# Patient Record
Sex: MALE | Race: WHITE | NOT HISPANIC OR LATINO | Employment: FULL TIME | URBAN - METROPOLITAN AREA
[De-identification: names, ages, dates, MRNs, and addresses within clinical notes are randomized per-mention and may not be internally consistent; named-entity substitution may affect disease eponyms.]

---

## 2023-10-16 ENCOUNTER — LAB (OUTPATIENT)
Dept: LAB | Facility: LAB | Age: 33
End: 2023-10-16
Payer: COMMERCIAL

## 2023-10-16 ENCOUNTER — OFFICE VISIT (OUTPATIENT)
Dept: PRIMARY CARE | Facility: CLINIC | Age: 33
End: 2023-10-16
Payer: COMMERCIAL

## 2023-10-16 VITALS
DIASTOLIC BLOOD PRESSURE: 70 MMHG | HEIGHT: 74 IN | SYSTOLIC BLOOD PRESSURE: 128 MMHG | OXYGEN SATURATION: 98 % | WEIGHT: 199 LBS | BODY MASS INDEX: 25.54 KG/M2 | HEART RATE: 62 BPM | RESPIRATION RATE: 16 BRPM | TEMPERATURE: 97.2 F

## 2023-10-16 DIAGNOSIS — Z00.00 HEALTHCARE MAINTENANCE: ICD-10-CM

## 2023-10-16 DIAGNOSIS — L25.9 CONTACT DERMATITIS, UNSPECIFIED CONTACT DERMATITIS TYPE, UNSPECIFIED TRIGGER: ICD-10-CM

## 2023-10-16 DIAGNOSIS — Z00.00 HEALTHCARE MAINTENANCE: Primary | ICD-10-CM

## 2023-10-16 LAB
ALBUMIN SERPL BCP-MCNC: 4.6 G/DL (ref 3.4–5)
ALP SERPL-CCNC: 55 U/L (ref 33–120)
ALT SERPL W P-5'-P-CCNC: 22 U/L (ref 10–52)
ANION GAP SERPL CALC-SCNC: 12 MMOL/L (ref 10–20)
AST SERPL W P-5'-P-CCNC: 20 U/L (ref 9–39)
BASOPHILS # BLD AUTO: 0.05 X10*3/UL (ref 0–0.1)
BASOPHILS NFR BLD AUTO: 0.6 %
BILIRUB SERPL-MCNC: 1.2 MG/DL (ref 0–1.2)
BUN SERPL-MCNC: 18 MG/DL (ref 6–23)
CALCIUM SERPL-MCNC: 10.1 MG/DL (ref 8.6–10.3)
CHLORIDE SERPL-SCNC: 101 MMOL/L (ref 98–107)
CHOLEST SERPL-MCNC: 225 MG/DL (ref 0–199)
CHOLESTEROL/HDL RATIO: 6
CO2 SERPL-SCNC: 28 MMOL/L (ref 21–32)
CREAT SERPL-MCNC: 1.07 MG/DL (ref 0.5–1.3)
EOSINOPHIL # BLD AUTO: 0.38 X10*3/UL (ref 0–0.7)
EOSINOPHIL NFR BLD AUTO: 4.3 %
ERYTHROCYTE [DISTWIDTH] IN BLOOD BY AUTOMATED COUNT: 12.4 % (ref 11.5–14.5)
GFR SERPL CREATININE-BSD FRML MDRD: >90 ML/MIN/1.73M*2
GLUCOSE SERPL-MCNC: 75 MG/DL (ref 74–99)
HCT VFR BLD AUTO: 47.4 % (ref 41–52)
HDLC SERPL-MCNC: 37.6 MG/DL
HGB BLD-MCNC: 16.5 G/DL (ref 13.5–17.5)
IMM GRANULOCYTES # BLD AUTO: 0.02 X10*3/UL (ref 0–0.7)
IMM GRANULOCYTES NFR BLD AUTO: 0.2 % (ref 0–0.9)
LDLC SERPL CALC-MCNC: 158 MG/DL
LYMPHOCYTES # BLD AUTO: 2.53 X10*3/UL (ref 1.2–4.8)
LYMPHOCYTES NFR BLD AUTO: 28.4 %
MCH RBC QN AUTO: 29.6 PG (ref 26–34)
MCHC RBC AUTO-ENTMCNC: 34.8 G/DL (ref 32–36)
MCV RBC AUTO: 85 FL (ref 80–100)
MONOCYTES # BLD AUTO: 0.8 X10*3/UL (ref 0.1–1)
MONOCYTES NFR BLD AUTO: 9 %
NEUTROPHILS # BLD AUTO: 5.13 X10*3/UL (ref 1.2–7.7)
NEUTROPHILS NFR BLD AUTO: 57.5 %
NON HDL CHOLESTEROL: 187 MG/DL (ref 0–149)
NRBC BLD-RTO: 0 /100 WBCS (ref 0–0)
PLATELET # BLD AUTO: 272 X10*3/UL (ref 150–450)
PMV BLD AUTO: 10.2 FL (ref 7.5–11.5)
POTASSIUM SERPL-SCNC: 4.1 MMOL/L (ref 3.5–5.3)
PROT SERPL-MCNC: 7.8 G/DL (ref 6.4–8.2)
RBC # BLD AUTO: 5.58 X10*6/UL (ref 4.5–5.9)
SODIUM SERPL-SCNC: 137 MMOL/L (ref 136–145)
TRIGL SERPL-MCNC: 145 MG/DL (ref 0–149)
TSH SERPL-ACNC: 1.48 MIU/L (ref 0.44–3.98)
VLDL: 29 MG/DL (ref 0–40)
WBC # BLD AUTO: 8.9 X10*3/UL (ref 4.4–11.3)

## 2023-10-16 PROCEDURE — 80053 COMPREHEN METABOLIC PANEL: CPT

## 2023-10-16 PROCEDURE — 85025 COMPLETE CBC W/AUTO DIFF WBC: CPT

## 2023-10-16 PROCEDURE — 1036F TOBACCO NON-USER: CPT | Performed by: INTERNAL MEDICINE

## 2023-10-16 PROCEDURE — 80061 LIPID PANEL: CPT

## 2023-10-16 PROCEDURE — 84443 ASSAY THYROID STIM HORMONE: CPT

## 2023-10-16 PROCEDURE — 99395 PREV VISIT EST AGE 18-39: CPT | Performed by: INTERNAL MEDICINE

## 2023-10-16 PROCEDURE — 36415 COLL VENOUS BLD VENIPUNCTURE: CPT

## 2023-10-16 RX ORDER — TRIAMCINOLONE ACETONIDE 1 MG/G
CREAM TOPICAL 2 TIMES DAILY
Qty: 30 G | Refills: 0 | Status: SHIPPED | OUTPATIENT
Start: 2023-10-16

## 2023-10-16 ASSESSMENT — ENCOUNTER SYMPTOMS
ROS GI COMMENTS: NEGATIVE FOR HERNIA.
SLEEP DISTURBANCE: 0
BLOOD IN STOOL: 0
CONSTIPATION: 0
DIARRHEA: 0
ABDOMINAL PAIN: 0
PALPITATIONS: 0
SHORTNESS OF BREATH: 0

## 2023-10-16 ASSESSMENT — PATIENT HEALTH QUESTIONNAIRE - PHQ9
2. FEELING DOWN, DEPRESSED OR HOPELESS: NOT AT ALL
1. LITTLE INTEREST OR PLEASURE IN DOING THINGS: NOT AT ALL
SUM OF ALL RESPONSES TO PHQ9 QUESTIONS 1 AND 2: 0

## 2023-10-16 NOTE — PROGRESS NOTES
Patient here for a physical - declined chaperone - new patient visit     Subjective   Patient ID: Kailash Feldman is a 32 y.o. male who presents for Annual Exam and New Patient Visit.  He is generally doing well today and has no complaints.    The patient reports a recurrent mildly pruritic rash that responds well to topical hydrocortisone.      The patient recalls having myringotomy tubes placed during childhood, but denies any significant hearing impairment.      The patient denies any vision changes, and wears prescription contacts.  He follows regularly with a Dentist, and reports good sleep quality.  The patient denies any abdominal pain, hematochezia, melena, bowel problems, testicular pain, or hernia.  He denies any known family history of colon cancer.     The patient's father has a history of myocardial infarction treated with CABG.  The patient himself denies any dyspnea with exertion, palpitation, chest pain, or chest pressure.  He denies any other known significant family history.      The patient is from [facility], and has been in [place] since 2022.        Review of Systems   HENT:  Negative for hearing loss.    Eyes:  Negative for visual disturbance.   Respiratory:  Negative for shortness of breath.    Cardiovascular:  Negative for chest pain and palpitations.   Gastrointestinal:  Negative for abdominal pain, blood in stool, constipation and diarrhea.        Negative for hernia.   Genitourinary:  Negative for testicular pain.   Skin:  Positive for rash.   Psychiatric/Behavioral:  Negative for sleep disturbance.      Objective   Physical Exam  Constitutional:       Appearance: Normal appearance.   Neck:      Vascular: No carotid bruit.   Cardiovascular:      Rate and Rhythm: Normal rate and regular rhythm.      Heart sounds: Normal heart sounds.   Pulmonary:      Effort: Pulmonary effort is normal.      Breath sounds: Normal breath sounds.   Abdominal:      General: Bowel sounds are normal.       Palpations: Abdomen is soft.      Tenderness: There is no abdominal tenderness.   Skin:     General: Skin is warm and dry.   Neurological:      General: No focal deficit present.      Mental Status: He is alert and oriented to person, place, and time. Mental status is at baseline.   Psychiatric:         Mood and Affect: Mood normal.         Behavior: Behavior normal.       Assessment/Plan   Problem List Items Addressed This Visit    None  Visit Diagnoses         Codes    Healthcare maintenance    -  Primary Z00.00    Relevant Orders    Lipid panel    CBC and Auto Differential    Comprehensive metabolic panel    Tsh With Reflex To Free T4 If Abnormal    Contact dermatitis, unspecified contact dermatitis type, unspecified trigger     L25.9    Relevant Medications    triamcinolone (Kenalog) 0.1 % cream            CPE Performed  -  Discussed healthy diet and regular exercise.    -  Physical exam overall unremarkable. Immunizations reviewed and updated accordingly. Healthy lifestyle choices discussed (tobacco avoidance, appropriate alcohol use, avoidance of illicit substances).   -  Patient is wearing seatbelt.   -  Screening lab work ordered as indicated.    -  Age appropriate screening tests reviewed with patient.        IMPRESSION/PLAN:    Contact Dermatitis  - Prescribed triamcinolone 0.1% cream to be applied twice daily as needed.     Family History of CAD  - RRR on exam, no murmurs.  No history of chest pain, chest pressure, palpitations, or dyspnea.  Monitor for now.  Advised patient to call the clinic with any symptoms, and he verbalized understanding.    /70 in the office today.     Health Maintenance  - Routine labs ordered including CBC, CMP, and a lipid panel to be completed in the fasting state. Added TSH.     Follow-up according to routine health maintenance, call sooner if needed.        Scribe Attestation  By signing my name below, ILucia, Bella   attest that this documentation has been  prepared under the direction and in the presence of Jesus Field DO.